# Patient Record
Sex: FEMALE | Race: WHITE | Employment: STUDENT | URBAN - NONMETROPOLITAN AREA
[De-identification: names, ages, dates, MRNs, and addresses within clinical notes are randomized per-mention and may not be internally consistent; named-entity substitution may affect disease eponyms.]

---

## 2017-12-05 ENCOUNTER — HOSPITAL ENCOUNTER (EMERGENCY)
Age: 22
Discharge: HOME | End: 2017-12-05
Payer: MEDICAID

## 2017-12-05 VITALS
SYSTOLIC BLOOD PRESSURE: 122 MMHG | DIASTOLIC BLOOD PRESSURE: 69 MMHG | HEART RATE: 88 BPM | BODY MASS INDEX: 43.36 KG/M2 | WEIGHT: 222 LBS | TEMPERATURE: 97.5 F | RESPIRATION RATE: 16 BRPM | OXYGEN SATURATION: 96 %

## 2017-12-05 DIAGNOSIS — H65.03 BILATERAL ACUTE SEROUS OTITIS MEDIA, RECURRENCE NOT SPECIFIED: ICD-10-CM

## 2017-12-05 DIAGNOSIS — H61.23 BILATERAL IMPACTED CERUMEN: Primary | ICD-10-CM

## 2017-12-05 PROCEDURE — 99213 OFFICE O/P EST LOW 20 MIN: CPT | Performed by: NURSE PRACTITIONER

## 2017-12-05 PROCEDURE — 99213 OFFICE O/P EST LOW 20 MIN: CPT

## 2017-12-05 RX ORDER — OMEGA-3/DHA/EPA/FISH OIL 1000 MG
1 CAPSULE ORAL 2 TIMES DAILY
Qty: 30 CAPSULE | Refills: 0 | Status: SHIPPED | OUTPATIENT
Start: 2017-12-05 | End: 2017-12-20

## 2017-12-05 RX ORDER — AZELASTINE 1 MG/ML
1 SPRAY, METERED NASAL 2 TIMES DAILY
Qty: 1 BOTTLE | Refills: 0 | Status: SHIPPED | OUTPATIENT
Start: 2017-12-05 | End: 2018-04-27

## 2017-12-05 RX ORDER — AMOXICILLIN 500 MG/1
500 CAPSULE ORAL 3 TIMES DAILY
Qty: 30 CAPSULE | Refills: 0 | Status: SHIPPED | OUTPATIENT
Start: 2017-12-05 | End: 2017-12-15

## 2017-12-05 ASSESSMENT — PAIN DESCRIPTION - LOCATION: LOCATION: EAR

## 2017-12-05 ASSESSMENT — ENCOUNTER SYMPTOMS
APNEA: 0
WHEEZING: 0
SHORTNESS OF BREATH: 0
SWOLLEN GLANDS: 0
CHEST TIGHTNESS: 0
COUGH: 1
RHINORRHEA: 1
STRIDOR: 0
SINUS PAIN: 0
CHOKING: 0

## 2017-12-05 ASSESSMENT — PAIN SCALES - GENERAL: PAINLEVEL_OUTOF10: 5

## 2017-12-05 ASSESSMENT — PAIN DESCRIPTION - PAIN TYPE: TYPE: ACUTE PAIN

## 2017-12-05 ASSESSMENT — PAIN DESCRIPTION - ORIENTATION: ORIENTATION: RIGHT;LEFT

## 2017-12-05 ASSESSMENT — PAIN DESCRIPTION - FREQUENCY: FREQUENCY: CONTINUOUS

## 2017-12-05 ASSESSMENT — PAIN DESCRIPTION - DESCRIPTORS: DESCRIPTORS: ACHING;DULL

## 2017-12-05 NOTE — ED PROVIDER NOTES
DISPOSITION/PLAN       PATIENT REFERRED TO:  Jay Jay Sanford, CNP  [address]  [facility]  [address]  [facility]  [phone]    Schedule an appointment as soon as possible for a visit   For  re-check    DISCHARGE MEDICATIONS:  Discharge Medication List as of 12/5/2017 12:16 PM      START taking these medications    Details   carbamide peroxide (DEBROX) 6.5 % otic solution Place 5 drops into the left ear nightly for 5 days Repeat on right ear, Disp-1 Bottle, R-0Normal      amoxicillin (AMOXIL) 500 MG capsule Take 1 capsule by mouth 3 times daily for 10 days, Disp-30 capsule, R-0Normal      Probiotic Product (PROBIOTIC ACIDOPHILUS BIOBEADS) CAPS Take 1 capsule by mouth 2 times daily for 15 days, Disp-30 capsule, R-0Normal      azelastine (ASTELIN) 0.1 % nasal spray 1 spray by Nasal route 2 times daily for 14 days Use in each nostril as directed, Disp-1 Bottle, R-0Normal           Discharge Medication List as of 12/5/2017 12:16 PM          SPENSER Pardo NP  12/05/17 1234

## 2017-12-13 ENCOUNTER — NURSE TRIAGE (OUTPATIENT)
Dept: ADMINISTRATIVE | Age: 22
End: 2017-12-13
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
## 2018-02-05 ENCOUNTER — HOSPITAL ENCOUNTER (EMERGENCY)
Age: 23
Discharge: HOME | End: 2018-02-05
Payer: MEDICAID

## 2018-02-05 VITALS
DIASTOLIC BLOOD PRESSURE: 75 MMHG | HEART RATE: 65 BPM | TEMPERATURE: 97.2 F | OXYGEN SATURATION: 100 % | BODY MASS INDEX: 44.14 KG/M2 | SYSTOLIC BLOOD PRESSURE: 124 MMHG | RESPIRATION RATE: 16 BRPM | WEIGHT: 226 LBS

## 2018-02-05 DIAGNOSIS — H60.391 INFECTIVE OTITIS EXTERNA OF RIGHT EAR: Primary | ICD-10-CM

## 2018-02-05 DIAGNOSIS — H65.01 RIGHT ACUTE SEROUS OTITIS MEDIA, RECURRENCE NOT SPECIFIED: ICD-10-CM

## 2018-02-05 PROCEDURE — 99212 OFFICE O/P EST SF 10 MIN: CPT | Performed by: NURSE PRACTITIONER

## 2018-02-05 PROCEDURE — 99212 OFFICE O/P EST SF 10 MIN: CPT

## 2018-02-05 RX ORDER — CIPROFLOXACIN AND DEXAMETHASONE 3; 1 MG/ML; MG/ML
4 SUSPENSION/ DROPS AURICULAR (OTIC) 2 TIMES DAILY
Qty: 1 BOTTLE | Refills: 0 | Status: SHIPPED | OUTPATIENT
Start: 2018-02-05 | End: 2018-02-12

## 2018-02-05 RX ORDER — AMOXICILLIN 500 MG/1
1000 CAPSULE ORAL 3 TIMES DAILY
Qty: 42 CAPSULE | Refills: 0 | Status: SHIPPED | OUTPATIENT
Start: 2018-02-05 | End: 2018-02-12

## 2018-02-05 ASSESSMENT — PAIN SCALES - GENERAL: PAINLEVEL_OUTOF10: 5

## 2018-02-05 ASSESSMENT — PAIN DESCRIPTION - PAIN TYPE: TYPE: ACUTE PAIN

## 2018-02-05 ASSESSMENT — ENCOUNTER SYMPTOMS
EYE DISCHARGE: 0
SORE THROAT: 0
RHINORRHEA: 0
EYE PAIN: 0
EYE ITCHING: 0
SINUS PAIN: 0
COUGH: 0
SHORTNESS OF BREATH: 0
SINUS PRESSURE: 0

## 2018-02-05 NOTE — ED TRIAGE NOTES
Pt states left ear has been painful for over a week and home treatments are no longer working and now right ear is bothering her too

## 2018-02-05 NOTE — ED PROVIDER NOTES
[facility]  Urgent Care Encounter       CHIEF COMPLAINT       Chief Complaint   Patient presents with    Otalgia       Nurses Notes reviewed and I agree except as noted in the HPI. HISTORY OF PRESENT ILLNESS   Mick Rene is a 23 y.o. female who presents For a one-week history of right ear pain. She states that this has been ongoing but progressively getting worse. She has tried over-the-counter remedies to help with this ear pain. She denies fever, chills, nausea, vomiting, diarrhea. She has had ear infections in the past.  She states this is typical for her ear infections, but she has let this go on longer than normal.    HPI    REVIEW OF SYSTEMS     Review of Systems   Constitutional: Positive for activity change. Negative for appetite change, chills, fatigue and fever. HENT: Positive for ear pain. Negative for congestion, ear discharge, rhinorrhea, sinus pain, sinus pressure, sneezing and sore throat. Eyes: Negative for pain, discharge and itching. Respiratory: Negative for cough and shortness of breath. Cardiovascular: Negative for chest pain. PAST MEDICAL HISTORY         Diagnosis Date    ADHD (attention deficit hyperactivity disorder)     Allergic rhinitis     Anxiety     Headache     Rh incompatibility     Seizures ([facility] [address] 1996    as a child       SURGICAL HISTORY     Patient  has a past surgical history that includes hernia repair (2001). CURRENT MEDICATIONS       Discharge Medication List as of 2/5/2018 10:30 AM      CONTINUE these medications which have NOT CHANGED    Details   azelastine (ASTELIN) 0.1 % nasal spray 1 spray by Nasal route 2 times daily for 14 days Use in each nostril as directed, Disp-1 Bottle, R-0Normal             ALLERGIES     Patient is has No Known Allergies.     Patients   Immunization History   Administered Date(s) Administered    Rho (D) Immune Globulin 06/18/2014, 09/17/2014    Tdap (Boostrix, Adacel) 09/18/2014       FAMILY HISTORY     Patient's family history includes Seizures in her maternal uncle. She was adopted. SOCIAL HISTORY     Patient  reports that she has never smoked. She has never used smokeless tobacco. She reports that she does not drink alcohol or use drugs. PHYSICAL EXAM     ED TRIAGE VITALS  BP: 124/75, Temp: 97.2 °F (36.2 °C), Pulse: 65, Resp: 16, SpO2: 100 %,Estimated body mass index is 44.14 kg/m² as calculated from the following:    Height as of 4/4/17: 5' (1.524 m). Weight as of this encounter: 226 lb (102.5 kg). ,No LMP recorded. Physical Exam   Constitutional: Vital signs are normal. She appears well-developed and well-nourished. She is active. Non-toxic appearance. She does not have a sickly appearance. She does not appear ill. No distress. HENT:   Head: Normocephalic and atraumatic. Right Ear: There is swelling and tenderness. There is mastoid tenderness. Tympanic membrane is erythematous and bulging. Tympanic membrane is not injected. A middle ear effusion is present. No hemotympanum. Decreased hearing is noted. Left Ear: Tympanic membrane, external ear and ear canal normal.   Nose: Nose normal.   Mouth/Throat: Uvula is midline, oropharynx is clear and moist and mucous membranes are normal.   Cardiovascular: Normal rate. Pulmonary/Chest: Effort normal.   Neurological: She is alert. DIAGNOSTIC RESULTS   Labs:No results found for this visit on 02/05/18. IMAGING:    No orders to display         EKG:      URGENT CARE COURSE:     Vitals:    02/05/18 0957   BP: 124/75   Pulse: 65   Resp: 16   Temp: 97.2 °F (36.2 °C)   SpO2: 100%   Weight: 226 lb (102.5 kg)       Medications - No data to display    ED Course        PROCEDURES:  None    FINAL IMPRESSION      1. Infective otitis externa of right ear    2. Right acute serous otitis media, recurrence not specified          DISPOSITION/PLAN   DISPOSITION Patient was discharged home.   Was diagnosed with sinusitis external and otitis media of

## 2018-02-19 ENCOUNTER — HOSPITAL ENCOUNTER (OUTPATIENT)
Dept: PHYSICAL THERAPY | Age: 23
Setting detail: THERAPIES SERIES
Discharge: HOME | End: 2018-02-19
Payer: MEDICAID

## 2018-02-19 PROCEDURE — 97110 THERAPEUTIC EXERCISES: CPT

## 2018-02-19 PROCEDURE — 97161 PT EVAL LOW COMPLEX 20 MIN: CPT

## 2018-02-19 ASSESSMENT — PAIN SCALES - GENERAL: PAINLEVEL_OUTOF10: 7

## 2018-02-19 ASSESSMENT — PAIN DESCRIPTION - PAIN TYPE: TYPE: CHRONIC PAIN

## 2018-02-19 ASSESSMENT — PAIN DESCRIPTION - ORIENTATION: ORIENTATION: LOWER

## 2018-02-19 ASSESSMENT — PAIN DESCRIPTION - LOCATION: LOCATION: BACK

## 2018-02-19 NOTE — PROGRESS NOTES
I certify that I have examined the patient below and determined that Physical Medicine and Rehabilitation service is necessary; that the secondary diagnosis for the provision of rehabilitation services is consistent with identified needs; that service will be furnished on an outpatient basis while the patient is in my care; that I approve the above plan of care for up to 90 days or as specifically noted above and will review it within that time frame or more often if the patients condition requires. Attestation, signature or co-signature of physician indicates approval of certification requirements.    ________________________ ____________ __________  Physician Signature   Date   Time       [place]     Time In: 3335  Time Out: 8063  Minutes: 60  Timed Code Treatment Minutes: 10 Minutes     Date: 2018  Patient Name: Virginia Junior,  Gender:  female        CSN: [id number]   : 1995  (23 y.o.)        Referring Practitioner: Dr. Edinson Henderson      Diagnosis: M51.37 Other intervertebral disc degeneration, lumbosacral region, M48.07 Spinal Stenosis of lumbosacral region  Treatment Diagnosis: Lumbago  Additional Pertinent Hx: Nothing per patient       General:  PT Visit Information  Onset Date: 18  PT Insurance Information: [address] visits. No authorization required. Modalities are covered, however iontophoresis is not a covered benefit. Hot/cold packs are not covered. Total # of Visits to Date: 1  Plan of Care/Certification Expiration Date: 18  Progress Note Counter: 1/10 for PN                        See Medical History Questionnaire for information related to allergies and medications. Subjective:  Comments: Follow up with doctor as needed. Other (Comment): Isometrics and strengthening with modalities as needed 2-3 times per week for 6 weeks.      Subjective: Patient putting on my socks (or stockings) because of the pain in my back. No   I only walk short distances because of the pain in my back. Yes   I sleep less because of the pain in my back. Yes   Because of the pain in my back, I get dressed with help from someone else. No   I sit down most of the day because of the pain in my back. No   I avoid heavy jobs around the house because of the pain in my back. No   Because of the pain in my back, I am more irritable and bad tempered with people. Yes   Because of the pain in my back, I go upstairs more slowly than usual. No   I stay in bed most of the time because of the pain in my back. No   TOTAL NUMBER OF YES RESPONSES 9/24                   Activity Tolerance:  Activity Tolerance: Patient Tolerated treatment well    Assessment: Body structures, Functions, Activity limitations: Decreased functional mobility , Decreased ROM, Decreased balance, Decreased sensation, Decreased endurance, Decreased strength  Prognosis: Good  REQUIRES PT FOLLOW UP: Yes  Discharge Recommendations: Continue to assess pending progress    Patient Education:  Patient Education: Patient educated on POC and HEP                   Plan:  Times per week: 2-3 times per week  Plan weeks: 8 weeks  Specific instructions for Next Treatment: Core and LE stretches and strengthening, posture and body mechanics, modalities as needed  Current Treatment Recommendations: Strengthening, ROM, Pain Management, Positioning, Modalities, Home Exercise Program, Transfer Training  Plan Comment: POC initiated today    History: Personal factors or comorbidities that impact plan of care - Low Complexity: no personal factors or comorbidities    Examination: Body structures and functions, activity limitations, participation restrictions; using standardized tests and measures - Low Complexity: 1-2 body structures and functional, activity limitation and/or participation restrictions.    See restrictions and objective section above for

## 2018-02-22 ENCOUNTER — HOSPITAL ENCOUNTER (OUTPATIENT)
Dept: PHYSICAL THERAPY | Age: 23
Setting detail: THERAPIES SERIES
Discharge: HOME | End: 2018-02-22
Payer: MEDICAID

## 2018-02-22 PROCEDURE — 97110 THERAPEUTIC EXERCISES: CPT

## 2018-02-22 ASSESSMENT — PAIN DESCRIPTION - PAIN TYPE: TYPE: CHRONIC PAIN

## 2018-02-22 ASSESSMENT — PAIN DESCRIPTION - LOCATION: LOCATION: BACK

## 2018-02-22 ASSESSMENT — PAIN SCALES - GENERAL: PAINLEVEL_OUTOF10: 6

## 2018-02-22 ASSESSMENT — PAIN DESCRIPTION - ORIENTATION: ORIENTATION: LOWER

## 2018-02-26 ENCOUNTER — HOSPITAL ENCOUNTER (OUTPATIENT)
Dept: PHYSICAL THERAPY | Age: 23
Setting detail: THERAPIES SERIES
Discharge: HOME | End: 2018-02-26
Payer: MEDICAID

## 2018-02-26 NOTE — PROGRESS NOTES
[facility]  PHYSICAL THERAPY MISSED TREATMENT NOTE  OUTPATIENT REHABILITATION    Date: 2018  Patient Name: Maylin Farias        MRN: [id number]   : 1995  (23 y.o.)  Gender: female           PT Visit Information  No Show: 1    REASON FOR MISSED TREATMENT:  No Show/No Call. Patient was called with next scheduled appointment. Will need to watch attendance. Radha Sterling  [address], [address] [facility]

## 2018-03-01 ENCOUNTER — HOSPITAL ENCOUNTER (OUTPATIENT)
Dept: PHYSICAL THERAPY | Age: 23
Setting detail: THERAPIES SERIES
Discharge: HOME | End: 2018-03-01
Payer: MEDICAID

## 2018-03-01 PROCEDURE — 97110 THERAPEUTIC EXERCISES: CPT

## 2018-03-01 NOTE — PROGRESS NOTES
ext due to pain last visit  Exercise 7: seated on red ball x 15 rows/sh ext yellow band abd bracing, x10 marching, LAQ B , UE x 10 sh flexion/abd  No pain   Exercise 8: Seated trunk flexion stretch x 5 hold 5-10 sec   Exercise 9: Standing hamstring stretches at steps x3 B hold 10 sec  cues for technique frequently   Exercise 10: holding red swiss ball x 10 B lunges and squats  mod cues for technique  pt rushes and decreased overall body awareness  Mild LBP after activity  Exercise 11: ABd bracing x 10 heel rasies, marching, mini squats         Activity Tolerance:  Activity Tolerance: Patient Tolerated treatment well  Activity Tolerance: Pt reprots \"just a little weak-no pain. \" Updated HEP Emphasized consistency with HEP    Assessment: Body structures, Functions, Activity limitations: Decreased functional mobility , Decreased ROM, Decreased balance, Decreased sensation, Decreased endurance, Decreased strength  Assessment: No pain-just a little 'weak. \" Pt has not been compliant with HEP. Emphaszied the importnce of core strengtheing and nees consistency for better results. Updated HEP and progressed exs in clinic. Thoroughly educated body mechanics and lifting. Monitor pain  Prognosis: Good       Patient Education:  Patient Education: HEP, standing B heel rasies, marching and mini squats                       Plan:  Times per week: 2-3 times per week  Plan weeks: 8 weeks  Specific instructions for Next Treatment: Core and LE stretches and strengthening, posture and body mechanics, modalities as needed  Current Treatment Recommendations: Strengthening, ROM, Pain Management, Positioning, Modalities, Home Exercise Program, Transfer Training  Plan Comment: POC initiated today    Goals:  Patient goals : \"Get rid of pain\"    Short term goals  Time Frame for Short term goals: 4 weeks  Short term goal 1: Improve Alan Bob to 5/24 or less to assist with sleeping at night. Short term goal 2:  Increase lumbar ROM to [facility] to assist with putting shoes and socks on. Short term goal 3: Increase core strength with no cues needed for abdominal bracing to assist with carrying 1year old daughter. Short term goal 4: Improve posture needing no cues for correction to assist with preventing injury. Short term goal 5: Decrease pain in low back to 4/10 to assist with sleeping at night. Long term goals  Time Frame for Long term goals : 8 weeks  Long term goal 1: Independent with HEP and with progression to assist with decreasing pain.            Roderick Rangel  [id number]

## 2018-03-05 ENCOUNTER — APPOINTMENT (OUTPATIENT)
Dept: PHYSICAL THERAPY | Age: 23
End: 2018-03-05
Payer: MEDICAID

## 2018-03-08 ENCOUNTER — HOSPITAL ENCOUNTER (OUTPATIENT)
Dept: PHYSICAL THERAPY | Age: 23
Setting detail: THERAPIES SERIES
Discharge: HOME | End: 2018-03-08
Payer: MEDICAID

## 2018-03-09 NOTE — PROGRESS NOTES
[address] [facility]    Patient Name: Jared Burgess        CSN: [id number]   YOB: 1995  Gender: female          Patient is discharged from Physical Therapy services at this time. See last note for details related to results of therapy and goal achievement. Reason for discharge:  Patient is being discharged due to attendance. Patient cancelled 1 appointment and no-showed 2 appointments. Please see previous notes for further details. Jaleesa Muniz  [address], [address] [facility]

## 2018-03-12 ENCOUNTER — HOSPITAL ENCOUNTER (OUTPATIENT)
Dept: PHYSICAL THERAPY | Age: 23
Setting detail: THERAPIES SERIES
Discharge: HOME | End: 2018-03-12
Payer: MEDICAID

## 2018-03-15 ENCOUNTER — APPOINTMENT (OUTPATIENT)
Dept: PHYSICAL THERAPY | Age: 23
End: 2018-03-15
Payer: MEDICAID

## 2018-03-19 ENCOUNTER — APPOINTMENT (OUTPATIENT)
Dept: PHYSICAL THERAPY | Age: 23
End: 2018-03-19
Payer: MEDICAID

## 2018-04-27 ENCOUNTER — HOSPITAL ENCOUNTER (EMERGENCY)
Age: 23
Discharge: HOME | End: 2018-04-27
Payer: MEDICAID

## 2018-04-27 VITALS
WEIGHT: 227 LBS | HEART RATE: 89 BPM | BODY MASS INDEX: 44.57 KG/M2 | RESPIRATION RATE: 16 BRPM | SYSTOLIC BLOOD PRESSURE: 136 MMHG | TEMPERATURE: 98.6 F | DIASTOLIC BLOOD PRESSURE: 71 MMHG | HEIGHT: 60 IN | OXYGEN SATURATION: 96 %

## 2018-04-27 DIAGNOSIS — S46.811A STRAIN OF RIGHT TRAPEZIUS MUSCLE, INITIAL ENCOUNTER: Primary | ICD-10-CM

## 2018-04-27 PROCEDURE — 99282 EMERGENCY DEPT VISIT SF MDM: CPT

## 2018-04-27 PROCEDURE — 6370000000 HC RX 637 (ALT 250 FOR IP): Performed by: NURSE PRACTITIONER

## 2018-04-27 RX ORDER — LIDOCAINE 40 MG/G
CREAM TOPICAL PRN
Status: DISCONTINUED | OUTPATIENT
Start: 2018-04-27 | End: 2018-04-27 | Stop reason: HOSPADM

## 2018-04-27 RX ORDER — CYCLOBENZAPRINE HCL 10 MG
10 TABLET ORAL ONCE
Status: COMPLETED | OUTPATIENT
Start: 2018-04-27 | End: 2018-04-27

## 2018-04-27 RX ORDER — NAPROXEN 250 MG/1
500 TABLET ORAL ONCE
Status: COMPLETED | OUTPATIENT
Start: 2018-04-27 | End: 2018-04-27

## 2018-04-27 RX ORDER — LIDOCAINE 50 MG/G
OINTMENT TOPICAL
Qty: 30 G | Refills: 0 | Status: SHIPPED | OUTPATIENT
Start: 2018-04-27

## 2018-04-27 RX ORDER — TIZANIDINE 4 MG/1
4 TABLET ORAL EVERY 6 HOURS PRN
Qty: 20 TABLET | Refills: 0 | Status: SHIPPED | OUTPATIENT
Start: 2018-04-27

## 2018-04-27 RX ORDER — NAPROXEN 500 MG/1
500 TABLET ORAL 2 TIMES DAILY WITH MEALS
Qty: 30 TABLET | Refills: 0 | Status: SHIPPED | OUTPATIENT
Start: 2018-04-27 | End: 2018-05-12

## 2018-04-27 RX ADMIN — LIDOCAINE 4%: 4 CREAM TOPICAL at 12:38

## 2018-04-27 RX ADMIN — CYCLOBENZAPRINE 10 MG: 10 TABLET, FILM COATED ORAL at 12:39

## 2018-04-27 RX ADMIN — NAPROXEN 500 MG: 250 TABLET ORAL at 12:39

## 2018-04-27 ASSESSMENT — ENCOUNTER SYMPTOMS
EYE REDNESS: 0
SORE THROAT: 0
BACK PAIN: 0
PHOTOPHOBIA: 0
CONSTIPATION: 0
RHINORRHEA: 0
VOICE CHANGE: 0
COUGH: 0
NAUSEA: 0
VOMITING: 0
DIARRHEA: 0
BLOOD IN STOOL: 0
SINUS PRESSURE: 0
CHEST TIGHTNESS: 0
SHORTNESS OF BREATH: 0
COLOR CHANGE: 0
ABDOMINAL DISTENTION: 0
ABDOMINAL PAIN: 0
WHEEZING: 0

## 2018-04-27 ASSESSMENT — PAIN DESCRIPTION - LOCATION: LOCATION: SHOULDER

## 2018-04-27 ASSESSMENT — PAIN DESCRIPTION - DESCRIPTORS: DESCRIPTORS: ACHING

## 2018-04-27 ASSESSMENT — PAIN DESCRIPTION - ORIENTATION: ORIENTATION: RIGHT

## 2018-04-27 ASSESSMENT — PAIN SCALES - GENERAL: PAINLEVEL_OUTOF10: 7

## 2018-04-27 ASSESSMENT — PAIN DESCRIPTION - PAIN TYPE: TYPE: ACUTE PAIN

## 2018-04-27 ASSESSMENT — PAIN DESCRIPTION - FREQUENCY: FREQUENCY: CONTINUOUS
